# Patient Record
Sex: MALE
[De-identification: names, ages, dates, MRNs, and addresses within clinical notes are randomized per-mention and may not be internally consistent; named-entity substitution may affect disease eponyms.]

---

## 2017-08-31 NOTE — ED PDOC
Arrival/HPI





- General


Chief Complaint: High Blood Pressure


Time Seen by Provider: 08/31/17 21:20


Historian: Patient





- History of Present Illness


Narrative History of Present Illness (Text): 


08/31/17 21:35





A 52 year old male whose past medical history includes hypertension, presents 

to the emergency department with 1 week duration high blood pressure. The 

patient states that today he went to check his blood pressure and it was high. 

The pharmacy advised him to come into the emergency department. The patient 

notes that he checked his blood pressure prior to going on vacation 1 week ago 

and that it was higher than usual and went to visit his PMD who started him on 

Coreg 12.5 twice a day. He denies fevers chills, shortness of breath, chest pain

, abdominal pain, nausea, vomiting, diarrhea, cough, headache, dizziness, or 

any other complaint.





PMD: Dr. Ja Sauceda











Time/Duration: 1 week


Symptom Onset: Sudden


Symptom Course: Unchanged


Activities at Onset: Rest, Light


Context: Home





Past Medical History





- Provider Review


Nursing Documentation Reviewed: Yes





- Infectious Disease


Hx of Infectious Diseases: None





- Tetanus Immunization


Tetanus Immunization: Unknown





- Cardiac


Hx Cardiac Disorders: Yes


Hx Hypertension: Yes





- Endocrine/Metabolic


Hx Endocrine Disorders: Yes


Hx Diabetes Mellitus Type 2: Yes





- Psychiatric


Hx Depression: No


Hx Emotional Abuse: No


Hx Physical Abuse: No


Hx Substance Use: No





- Surgical History


Hx Appendectomy: Yes





- Suicidal Assessment


Feels Threatened In Home Enviroment: No





Family/Social History





- Physician Review


Nursing Documentation Reviewed: Yes


Family/Social History: No Known Family HX


Smoking Status: Never Smoked


Hx Alcohol Use: Yes


Frequency of alcohol use: Socially


Hx Substance Use: No


Hx Substance Use Treatment: No





Allergies/Home Meds


Allergies/Adverse Reactions: 


Allergies





No Known Allergies Allergy (Verified 06/16/13 13:03)


 








Home Medications: 


 Home Meds











 Medication  Instructions  Recorded  Confirmed


 


Carvedilol [Coreg] 12.5 mg PO BID 08/31/17 08/31/17


 


Losartan Potassium 100 mg PO DAILY 08/31/17 08/31/17


 


Metformin HCl [Glucophage] 1,000 mg PO BID 08/31/17 08/31/17


 


SITagliptin [Januvia] 50 mg PO DAILY 08/31/17 08/31/17


 


amLODIPine [Norvasc] 10 mg PO DAILY 08/31/17 08/31/17














Review of Systems





- Physician Review


All systems were reviewed & negative as marked: Yes





- Review of Systems


Constitutional: Other (Hypertensive).  absent: Fevers, Night Sweats


Respiratory: absent: SOB, Cough


Cardiovascular: absent: Chest Pain


Gastrointestinal: absent: Abdominal Pain, Diarrhea, Nausea, Vomiting


Neurological: absent: Headache, Dizziness





Physical Exam


Vital Signs Reviewed: Yes


Vital Signs











  Temp Pulse Resp BP Pulse Ox


 


 08/31/17 21:12  98.8 F  88  16  177/103 H  97











Temperature: Afebrile


Blood Pressure: Hypertensive


Pulse: Regular


Respiratory Rate: Normal


Appearance: Positive for: Well-Appearing, Non-Toxic, Comfortable


Pain Distress: None


Mental Status: Positive for: Alert and Oriented X 3





- Systems Exam


Head: Present: Atraumatic, Normocephalic


Pupils: Present: PERRL


Extroacular Muscles: Present: EOMI


Conjunctiva: Present: Normal


Mouth: Present: Moist Mucous Membranes


Neck: Present: Normal Range of Motion


Respiratory/Chest: Present: Clear to Auscultation, Good Air Exchange.  No: 

Respiratory Distress, Accessory Muscle Use


Cardiovascular: Present: Regular Rate and Rhythm, Normal S1, S2.  No: Murmurs


Abdomen: Present: Normal Bowel Sounds.  No: Tenderness, Distention, Peritoneal 

Signs


Back: Present: Normal Inspection


Upper Extremity: Present: Normal Inspection.  No: Cyanosis, Edema


Lower Extremity: Present: Normal Inspection.  No: Edema


Neurological: Present: GCS=15, CN II-XII Intact, Speech Normal


Skin: Present: Warm, Dry, Normal Color.  No: Rashes


Psychiatric: Present: Alert, Oriented x 3, Normal Insight, Normal Concentration





Medical Decision Making


ED Course and Treatment: 


08/31/17 21:42





Impression:


A 52 year old male presents to the emergency department with 1 week duration 

high blood pressure.





Plan:


-- Reassess and disposition








Progress Notes:


08/31/17 21:40: Case discussed with Dr. Peres. Recommends doubling the dose 

of Coreg. States he will follow up with the patient on Tuesday.














- Scribe Statement


The provider has reviewed the documentation as recorded by the Heather Mcadams





Provider Scribe Attestation:


All medical record entries made by the Scribe were at my direction and 

personally dictated by me. I have reviewed the chart and agree that the record 

accurately reflects my personal performance of the history, physical exam, 

medical decision making, and the department course for this patient. I have 

also personally directed, reviewed, and agree with the discharge instructions 

and disposition








Disposition/Present on Arrival





- Present on Arrival


Any Indicators Present on Arrival: No


History of DVT/PE: No


History of Uncontrolled Diabetes: No


Urinary Catheter: No


History of Decub. Ulcer: No


History Surgical Site Infection Following: None





- Disposition


Have Diagnosis and Disposition been Completed?: Yes


Diagnosis: 


 Hypertensive urgency





Disposition: HOME/ ROUTINE


Disposition Time: 21:49


Patient Problems: 


 Current Active Problems











Problem Status Onset


 


Hypertensive urgency Acute  











Condition: GOOD


Additional Instructions: 


Please follow up with Dr Sauceda on Tuesday. Double your dose of carvedilol: take 

25mg two times per day. Return to the ER for any chest pain, trouble breathing, 

visual disturbance, confusion, numbness, weakness, or for any other concerns. 


Referrals: 


Ja Sauceda JD, MD [Family Provider] - Follow up with primary


Forms:  [employer] (English)

## 2017-12-09 NOTE — HP
REASON FOR ADMISSION:  Left heart cath, possible angioplasty, abnormal

stress test.



BRIEF CLINICAL HISTORY:  This is 52-year-old male with past medical history

significant for Jehovah Witness, does not want any blood product during the

procedure if need arises.  Past history is also significant for diabetes,

hypertension, hyperlipidemia, who underwent stress test because of

complaining of chest pain while the patient is swimming.  Stress is

abnormal, so the patient is scheduled for active cardiac cath, possible

angioplasty.



PAST MEDICAL HISTORY:  Significant for diabetes, hypertension,

hyperlipidemia.



CURRENT MEDICATIONS:  The patient is on amlodipine 10 mg daily, Januvia 100

mg daily, metformin 1 g twice a day, losartan 100 mg daily, Plavix 75 mg

daily, Coreg 25 mg twice a day, atorvastatin 10 mg daily, aspirin 81 mg

daily.



ALLERGIES:  NO KNOWN DRUG ALLERGIES.



CARDIAC WORKUP:  Recent cardiac workup as follows, the patient had a stress

test dated 11/10/2017.  The patient walked for 9 minutes and 31 seconds on

the Rodrick protocol, achieved 90% rate.  No chest pain.  Some ST-T changes

noted, but the patient had baseline left bundle, so cannot comment on

change.  Nuclear scan shows diffuse hypokinesis, ejection fraction 34%,

abnormal partially reversible anteroseptal, apical, inferoseptal, and

inferior defect suggestive of ischemia and wall motion abnormality. 

Multiple area of ischemia noted.



REVIEW OF SYSTEMS:  As per HPI.



PHYSICAL EXAMINATION:  As follows:

VITAL SIGNS:  Height of the patient 5 feet 7 inches, weight of the patient

184, body mass index 28.8 kg/m2, heart rate 60, blood pressure 120/90.

HEENT:  PERRLA intact.

NECK:  Supple.  No carotid bruits or thyromegaly.

CHEST:  Clear to auscultation.

HEART:  S1 and S2 regular.

ABDOMEN:  Soft.

EXTREMITIES:  Clubbing and cyanosis negative.



LABORATORY DATA:  Blood workup pending.



IMPRESSION:  Diabetes, hypertension, hyperlipidemia, obesity, abnormal

stress test with multiple area of abnormality including apical, anterior,

anteroseptal, inferior, inferolateral ischemia with decreased left

ventricular function.



RECOMMENDATION:  We will load aspirin and Plavix, check the blood workup

when available.  Risks, benefits, and alternatives discussed with the

patient.  The patient is Jehovah's Witnesses and expressed his feelings

that he does not want any blood or blood product if need arises.  We will

respect that and try to reach left radial approach and load aspirin and

Plavix.  Further recommendation after cardiac catheterization.  We will

follow with you.



Thank you Dr. Sauceda/ Dr. Milton for providing us the opportunity in taking

care of the patient, Ned Morales.





__________________________________________

Maria L Tsai MD





DD:  12/09/2017 11:03:04

DT:  12/09/2017 13:10:50

Job # [id number]

## 2017-12-11 NOTE — CARD
--------------- APPROVED REPORT --------------





Procedure(s) performed: Left Heart Catheterization

PTCA with Stenting of Mid to Distal RCA with ARIA

PTCA with Stenting of Ostial Circunflex with ARIA

PTCA with Stenting of OM1  with ARIA



HISTORY

The patient is a 52 year-old male with a history of : most recent EF: 

34%. (EF Method: RADIONUCLIDE), diabetes mellitus with oral treatment 

, hypertension , dyslipidemia , had abnormal stress test, showing 

reversible Ischemia in lauren-septal, Apical, inferoseptal 

andinferior region, with reduced LV Fx. EF34%.



INDICATION

The indication(s) include : positive stress test, dyspnea.



CASE TECHNIQUE

The patient was brought electively to the Cardiac Catheterization 

Laboratory in a fasting state and was prepped and draped in a sterile 

manner. The left wrist was infiltrated with 2% Lidocaine subcutaneous 

anesthesia. A 6FR GLIDESHEATH ACCESS KIT sheath was inserted into the 

left radial artery without difficulty. Coronary angiography was 

performed using coronary diagnostic catheters. The left coronary 

system was accessed and visualized with a Diagnostic ,5 Fr JL 3.5 

catheter. The right coronary system was accessed and visualized with 

a Diagnostic ,5 Fr JR 4 catheter. The left ventricle was accessed and 

visualized with a 5 Fr Pigtail 145 (Angled) catheter. Left 

ventricular/Aortic Valve gradient assessed on pullback. Left 

ventriculogram was performed in MALAVE projection. Closure device was 

deployed with a Fr TR Band (Regular) without any complications. The 

patient tolerated the procedure well and there were no complications 

associated with the procedure. 



Vessel Analysis

The patient's coronary anatomy is right dominant.



The left main coronary artery is a medium size vessel with diffuse 

calcification noted throughout this vessel and without significant 

stenosis. The left main trifurcates to the left anterior descending, 

circumflex, and ramus.



The left anterior descending artery is a medium size vessel with 

diffuse calcification noted throughout this vessel and without 

significant stenosis. There is a 50% stenosis in the mid to distal 

segment. Multiple stenoses The first diagonal branch is a medium size 

vessel with diffuse calcification noted throughout this vessel and 

without significant stenosis. 



The circumflex artery is a medium size vessel with diffuse 

calcification noted throughout this vessel and with significant 

stenosis. There is a 80% stenosis in the ostial segment. The first 

obtuse marginal branch is a medium size vessel with diffuse 

calcification noted throughout this vessel and with significant 

stenosis. There is a 90% stenosis in the proximal segment. 



The ramus intermedius artery is a medium size vessel with diffuse 

calcification noted throughout this vessel and without significant 

stenosis. 



The right coronary artery is a large size vessel with diffuse 

calcification noted throughout this vessel and with significant 

stenosis. There is a 80-90% stenosis in the mid segment. three tandum 

lesions The right posterior descending artery is a large size vessel 

with diffuse calcification noted throughout this vessel and without 

significant stenosis. The right posterolateral branch is a medium 

size vessel with diffuse calcification noted throughout this vessel 

and without significant stenosis. 



Left Ventricle

The left ventricle is mildly enlarged in size with moderately 

decreased contractility. Ischemic cardiomyopathy. The left 

ventricular ejection fraction is estimated to be 35%. The left 

ventricular end diastolic pressure is 18 mmHg. There was no gradient 

across the aortic valve upon pullback.



PCI Technique Lesion

Anticoagulation was achieved with Heparin. Percutaneous coronary 

intervention was performed on the mid right coronary artery. The 

lesion stenosis prior to intervention was 80-90% with NEDA 2 flow. A 

6 Fr JR 3.5 Guide Catheter was used to engage the ostium.



BALLOON DILATION

A Balloon catheter 2.0 x 15 mm Mini Trek RX was inserted and inflated 

up to 12.00atm for 25seconds.



STENT DEPLOYMENT

A drug-eluting stent 3.0 x 30 mm Resolute ARIA was inserted and 

inflated up to 10.00atm for 15seconds.



POST STENT DEPLOYMENT BALLOON DILATION

A Balloon catheter 3.5 x 15 mm Trek RX NC was inserted and inflated 

up to 15.00atm for 20seconds.



Final angiography reveals 0 % stenosis with NEDA 3 flow.



PCI Technique Lesion 2

Percutaneous Coronary Intervention was performed on the first obtuse 

marginal branch segment. The lesion stenosis prior to intervention 

was 90% with NEDA 2 flow. A 6 Fr XB 3 Guide Catheter was used to 

engage the ostium. A Luge 182 Interventional Guidewire was used to 

cross the lesion.



BALLOON DILATION

A Balloon catheter 2.0 x 10 mm Sprinter RX was inserted and inflated 

up to 8.00atm for 10seconds. Body wire Choice PT,used in Cx



STENT DEPLOYMENT

A drug-eluting stent 2.5 x 26 mm Resolute ARIA was inserted and 

inflated up to 9.00atm for 15seconds.



POST STENT DEPLOYMENT BALLOON DILATION

A Balloon catheter 2.75 x 12 mm Sprinter NC was inserted and inflated 

up to 14.00atm for 20seconds.



Final angiography reveals 0 % stenosis with NEDA 3 flow.



PCI Technique Lesion 3

Percutaneous Coronary Intervention was performed on the Ostial 

circumflex artery segment. The lesion stenosis prior to intervention 

was 80% with NEDA 2 flow. A 6 Fr XB 3 Guide Catheter was used to 

engage the ostium. A Luge 182 Interventional Guidewire was used to 

cross the lesion.



STENT DEPLOYMENT

A drug-eluting stent 3.0 x 9 mm Resolute ARIA was inserted and 

inflated up to 10.00atm for 25seconds.



POST STENT DEPLOYMENT BALLOON DILATION

A Balloon catheter was inserted and inflated up to 12.00atm for 

10seconds.



Final angiography reveals 0 % stenosis with NEDA 3 flow.



Conclusion

Multi vessel CAD, But distal vesselk are poor target

 for CABG ( Not suitable for CABG).

decreased Lv Fx.EF-35%, EDP-18 mmof Hg.

Successful PTCA with Aria of MID RCA, Ostial Cx and OLIVIA



Recommendations

Cardiac Rehabilitation ReferralDaily ASA with Plavix for at least one 

year

Aggressive Medical TherapyCardiac Risk Reduction Program

Weight Loss Reduction Program

F/U LV fx eval in 3-6 months and if remains less than 35%, consider 

AICD.



CC; DR. Sauceda/ Karine.

## 2017-12-12 NOTE — CARD
--------------- APPROVED REPORT --------------





EKG Measurement

Heart [id number]

IL 186P43

[id number]

[id number]

[id number]



<Conclusion>

Normal sinus rhythm

Left bundle branch block

Abnormal ECG

## 2017-12-12 NOTE — CARD
--------------- APPROVED REPORT --------------





EKG Measurement

Heart [id number]

NC [id number]

[id number]

[id number]

[id number]



<Conclusion>

Normal sinus rhythm

Possible Left atrial enlargement

Left bundle branch block

Abnormal ECG

## 2017-12-12 NOTE — CON
DATE:  12/11/2017



HISTORY OF PRESENT ILLNESS:  The patient is a 52-year-old male who was

admitted for elective cardiac catheterization status post abnormal stress

test.  The patient had been complaining of some angina during exercise as

well as mild dyspnea on exertion.  The patient was seen on consultation by

Cardiology Dr. Tsai who performed a cardiac catheterization and stent

placement today.  The patient is lying in bed postoperatively in no acute

distress.  He denies any chest pain or shortness of breath at the present

time.



PAST MEDICAL HISTORY:  Includes type II diabetes mellitus, hypertension,

and hypercholesteremia.



PAST SURGICAL HISTORY:  The patient has no significant past surgical

history.



ALLERGIES:  THE PATIENT HAS NO KNOWN DRUG ALLERGIES



CURRENT MEDICATIONS:  Include amlodipine 10 mg daily, Plavix 75 mg daily,

Januvia 100 mg daily, metformin 1 g twice daily, carvedilol 25 mg twice

daily, Lipitor 10 mg daily, and aspirin 81 mg daily.



SOCIAL HISTORY:  The patient has no history of tobacco or alcohol use.  He

is independent with ADL's and _____.



REVIEW OF SYSTEMS:  Essentially negative other than above.



PHYSICAL EXAMINATION:

GENERAL:  The patient is a well-developed male, in no acute distress.

VITAL SIGNS:  Blood pressure 140/84, temperature 98, pulse 80 and

respiratory rate 16.

HEENT:  Head is normocephalic and atraumatic.  Pupils are equal, round and

reactive to light.  Extraocular movements are intact.

NECK:  Supple.  No thyromegaly.  No carotid bruit.  No adenopathy.

LUNGS:  Clear.

HEART:  Regular rate and rhythm with no murmurs, rubs, or gallops.

ABDOMEN:  Soft and nontender.  Bowel sounds are normoactive.

EXTREMITIES:  Without cyanosis, clubbing or edema.  Left arm surgical wound

is clean with dressing intact.  There is no cyanosis, clubbing or edema.

NEUROLOGICAL:  The patient is awake and oriented x3 without focal sensory

or motor deficits.

SKIN:  Warm and dry.



LABORATORY DATA:  WBC is 10.4, hemoglobin 13.3 and hematocrit 40.5.  Sodium

136, potassium 4.8, chloride 102, CO2 of 25, BUN 14, creatinine 0.9 and

glucose is slightly elevated 254.



IMPRESSION:

1.  Coronary artery disease status post catheterization with stent

placement.

2.  Hypertension.

3.  Type 2 diabetes mellitus.

4.  Hypercholesteremia.



PLAN:  Continue post catheterization care.  Cardiology follow up Dr. Tsai. 

We will follow the patient in the office within the next one to two weeks.





__________________________________________

CARLOS ALBERTO Vincent MD





DD:  12/11/2017 16:36:36

DT:  12/11/2017 19:36:15

Job # [id number]

## 2019-01-25 ENCOUNTER — HOSPITAL ENCOUNTER (OUTPATIENT)
Dept: HOSPITAL 42 - CARDIO | Age: 54
End: 2019-01-25
Payer: COMMERCIAL

## 2019-01-25 DIAGNOSIS — I25.10: ICD-10-CM

## 2019-01-25 DIAGNOSIS — R07.89: ICD-10-CM

## 2019-01-25 DIAGNOSIS — E11.9: Primary | ICD-10-CM

## 2019-03-31 ENCOUNTER — HOSPITAL ENCOUNTER (INPATIENT)
Dept: HOSPITAL 42 - ED | Age: 54
LOS: 2 days | Discharge: HOME | DRG: 247 | End: 2019-04-02
Attending: INTERNAL MEDICINE | Admitting: INTERNAL MEDICINE
Payer: COMMERCIAL

## 2019-03-31 VITALS — BODY MASS INDEX: 27.3 KG/M2

## 2019-03-31 DIAGNOSIS — Z83.3: ICD-10-CM

## 2019-03-31 DIAGNOSIS — T82.855A: ICD-10-CM

## 2019-03-31 DIAGNOSIS — I44.7: ICD-10-CM

## 2019-03-31 DIAGNOSIS — Z82.49: ICD-10-CM

## 2019-03-31 DIAGNOSIS — I21.4: Primary | ICD-10-CM

## 2019-03-31 DIAGNOSIS — Z79.82: ICD-10-CM

## 2019-03-31 DIAGNOSIS — E78.00: ICD-10-CM

## 2019-03-31 DIAGNOSIS — E78.5: ICD-10-CM

## 2019-03-31 DIAGNOSIS — Z79.02: ICD-10-CM

## 2019-03-31 DIAGNOSIS — I25.110: ICD-10-CM

## 2019-03-31 DIAGNOSIS — I10: ICD-10-CM

## 2019-03-31 DIAGNOSIS — E11.9: ICD-10-CM

## 2019-03-31 DIAGNOSIS — Z90.49: ICD-10-CM

## 2019-03-31 LAB
ALBUMIN SERPL-MCNC: 4 G/DL (ref 3–4.8)
ALBUMIN/GLOB SERPL: 1.2 {RATIO} (ref 1.1–1.8)
ALT SERPL-CCNC: 26 U/L (ref 7–56)
APPEARANCE UR: CLEAR
APTT BLD: 25.5 SECONDS (ref 26.9–38.3)
AST SERPL-CCNC: 25 U/L (ref 17–59)
BASOPHILS # BLD AUTO: 0.03 K/MM3 (ref 0–2)
BASOPHILS NFR BLD: 0.3 % (ref 0–3)
BILIRUB UR-MCNC: NEGATIVE MG/DL
BNP SERPL-MCNC: 63.5 PG/ML (ref 0–450)
BUN SERPL-MCNC: 14 MG/DL (ref 7–21)
CALCIUM SERPL-MCNC: 9.4 MG/DL (ref 8.4–10.5)
COLOR UR: YELLOW
EOSINOPHIL # BLD: 0.1 10*3/UL (ref 0–0.7)
EOSINOPHIL NFR BLD: 1.3 % (ref 1.5–5)
ERYTHROCYTE [DISTWIDTH] IN BLOOD BY AUTOMATED COUNT: 12.9 % (ref 11.5–14.5)
GFR NON-AFRICAN AMERICAN: > 60
GLUCOSE UR STRIP-MCNC: 250 MG/DL
HGB BLD-MCNC: 14 G/DL (ref 14–18)
INR PPP: 0.88
LEUKOCYTE ESTERASE UR-ACNC: NEGATIVE LEU/UL
LYMPHOCYTES # BLD: 3.3 10*3/UL (ref 1.2–3.4)
LYMPHOCYTES NFR BLD AUTO: 29.5 % (ref 22–35)
MCH RBC QN AUTO: 28.6 PG (ref 25–35)
MCHC RBC AUTO-ENTMCNC: 33.5 G/DL (ref 31–37)
MCV RBC AUTO: 85.5 FL (ref 80–105)
MONOCYTES # BLD AUTO: 0.4 10*3/UL (ref 0.1–0.6)
MONOCYTES NFR BLD: 3.9 % (ref 1–6)
PH UR STRIP: 6 [PH] (ref 4.7–8)
PLATELET # BLD: 239 10^3/UL (ref 120–450)
PMV BLD AUTO: 11.2 FL (ref 7–11)
PROT UR STRIP-MCNC: NEGATIVE MG/DL
PROTHROMBIN TIME: 9.9 SECONDS (ref 9.4–12.5)
RBC # BLD AUTO: 4.89 10^6/UL (ref 3.5–6.1)
RBC # UR STRIP: NEGATIVE /UL
SP GR UR STRIP: 1.02 (ref 1–1.03)
TROPONIN I SERPL-MCNC: < 0.01 NG/ML
UROBILINOGEN UR STRIP-ACNC: 0.2 E.U./DL
WBC # BLD AUTO: 11.1 10^3/UL (ref 4.5–11)

## 2019-03-31 PROCEDURE — 3E053PZ INTRODUCTION OF PLATELET INHIBITOR INTO PERIPHERAL ARTERY, PERCUTANEOUS APPROACH: ICD-10-PCS | Performed by: INTERNAL MEDICINE

## 2019-03-31 RX ADMIN — INSULIN HUMAN SCH UNITS: 100 INJECTION, SOLUTION PARENTERAL at 09:50

## 2019-03-31 RX ADMIN — EPTIFIBATIDE SCH MLS/HR: 0.75 INJECTION, SOLUTION INTRAVENOUS at 12:06

## 2019-03-31 RX ADMIN — EPTIFIBATIDE SCH MLS/HR: 0.75 INJECTION, SOLUTION INTRAVENOUS at 20:02

## 2019-03-31 RX ADMIN — METOPROLOL TARTRATE SCH MG: 5 INJECTION INTRAVENOUS at 15:44

## 2019-03-31 RX ADMIN — INSULIN HUMAN SCH: 100 INJECTION, SOLUTION PARENTERAL at 15:43

## 2019-03-31 RX ADMIN — METOPROLOL TARTRATE SCH MG: 5 INJECTION INTRAVENOUS at 21:42

## 2019-03-31 RX ADMIN — INSULIN HUMAN SCH UNITS: 100 INJECTION, SOLUTION PARENTERAL at 21:41

## 2019-03-31 NOTE — CARD
--------------- APPROVED REPORT --------------





Date of service: 03/31/2019



EKG Measurement

Heart [id number]

AR 180P44

[id number]

[id number]

[id number]



<Conclusion>

Sinus tachycardia 

Left bundle branch block

Abnormal ECG

## 2019-03-31 NOTE — CON
DATE:  2019



HISTORY OF PRESENT ILLNESS:  This 54-year-old gentleman with history of

diabetes mellitus type 2, hypercholesterolemia, and coronary artery

disease, status post stents placement 15 months ago who presented to

[facility] with substernal chest pain, pressure-like 5/10

without radiating to any other part of the body.  He did not complain of

any shortness of breath, fever, chills, or sweats; however, did have one

episode of vomiting.  The patient reports that the pain appeared about hour

and a half ago and was constant.



PAST MEDICAL HISTORY:  Diabetes mellitus type 2, hypercholesterolemia,

hypertension, and coronary artery disease.



FAMILY HISTORY:  His father  at the age of 69 of heart attack.



SOCIAL HISTORY:  Lifelong nonsmoker.  No alcohol or illicit drug abuse.



ALLERGIES:  NKDA.



MEDICATIONS AT HOME:  Norvasc, Januvia, metformin, losartan, Plavix, Coreg,

Lipitor, and aspirin.



REVIEW OF SYSTEMS:  Review of 12-organ system other than mentioned in

history of present illness is negative.



PHYSICAL EXAMINATION:

VITAL SIGNS:  Temperature 98, blood pressure 136/80, oxygen saturation 97%

on 2 L nasal cannula, respiratory rate 18, and heart rate 88.

ENT:  Head and neck atraumatic.

LUNGS:  Clear to auscultation bilaterally.

HEART:  Regular rate and rhythm.  S1 and S2 normal.

ABDOMEN:  Soft, nontender, and nondistended.

MUSCULOSKELETAL:  No C/C/E.

NEUROLOGIC:  The patient moves all extremities spontaneously.

SKIN:  Moist.

PSYCHIATRIC:  The patient is alert, awake, and oriented x3.



LABORATORY DATA:  Sodium 139, potassium 3.8, chloride 102, carbon dioxide

23, BUN 14, creatinine 0.8, and glucose 191.  AST 25, ALT 26, and total

bilirubin 0.3.  Troponin less than 0.01.  ProBNP is 63.5 and albumin 4. 

WBC 11.1, hemoglobin 14, and platelet count 239.



EKG showed ST depression in V5 and V6 and left bundle-branch block, which

appears to be old.



ASSESSMENT AND PLAN:  This is a 54-year-old gentleman who presented with

pressure-like substernal chest pain highly suspicious for cardiac in

origin.  First troponin is negative and subtle new changes on

EKG do not allow to solidly say whether they are relating to ischemia or 
represent

difference in position of the leads.  Left bundle-branch block is old.  At the

present time, I would proceed with aspirin, Plavix, beta-blocker, statins,

nitroglycerin drip, heparin drip, and trending troponin.  If second

troponin comes up negative, possibility of acute myocardial ischemia due to

coronary event would be much less likely.  We will continue to target euvolemia,

euglycemia, normothermia, and oxygen saturation more than 90%.  We will

continue with DVT and GI prophylaxes.



Addendum: second troponin came back elevated, cardiology service notified, nitro
drip increased, chest pain resolved, scheduled for angio/PCI in am



ccm time 40 min





__________________________________________

Gordo Page MD





DD:  2019 9:00:45

DT:  2019 14:16:47

Job # [id number]

MTDD

## 2019-03-31 NOTE — RAD
Date of service: 



03/31/2019



HISTORY:

 chest pain 



COMPARISON:

No prior. 



TECHNIQUE:

1 view obtained.



FINDINGS:



LUNGS:

No active pulmonary disease.



PLEURA:

No significant pleural effusion identified, no pneumothorax apparent.



CARDIOVASCULAR:

No aortic atherosclerotic calcification present.



Normal cardiac size. No pulmonary vascular congestion. 



OSSEOUS STRUCTURES:

No significant abnormalities.



VISUALIZED UPPER ABDOMEN:

Normal.



OTHER FINDINGS:

None.



IMPRESSION:

No active disease.

## 2019-03-31 NOTE — HP
DATE OF EXAM:  03/31/2019



HISTORY OF PRESENT ILLNESS:  The patient is a 54-year-old male admitted

through the Emergency Department today, complaining of precordial chest

pressure.  The patient denies any nausea, no vomiting, no shortness of

breath.  The patient has a history of coronary artery disease status post

stent placement x3.  He is admitted to the Intensive Care Unit for further

evaluation and monitoring with an elevation of troponin of 0.58, rising

from less than 0.01 previously.



PAST MEDICAL HISTORY:  Includes type 2 diabetes mellitus, hypertension, and

hypercholesterolemia.



PAST SURGICAL HISTORY:  Includes status post appendectomy.  There is no

history of cancer.



ALLERGIES:  PATIENT HAS NO KNOWN ALLERGIES.



CURRENT MEDICATIONS:  Include metformin 1000 mg twice daily, Januvia 100 mg

daily, losartan 100 mg daily, carvedilol 25 mg twice daily, Lipitor 10 mg

daily, and Plavix 75 mg daily.



FAMILY HISTORY:  The patient has a family history of heart disease and

diabetes.



SOCIAL HISTORY:  The patient denies any history of tobacco or alcohol use.



REVIEW OF SYSTEMS:  The patient denies any abdominal pain, no melena, and

no bright red blood per rectum.  No nausea, no vomiting, and no diarrhea. 

No fever, no chills, and no cough.  No jaundice and no rash.



PHYSICAL EXAMINATION:

GENERAL:  The patient is a well-developed male in no acute distress.

VITAL SIGNS:  Blood pressure 163/93, pulse 87, respiratory rate 23. 

Patient is afebrile.

HEENT:  Head is normocephalic and atraumatic.  Pupils equal, round,

reactive to light.  Extraocular movement intact.

NECK:  Supple.  No thyromegaly.  No carotid bruit.  No adenopathy.

LUNGS:  Clear.

HEART:  Regular rate and rhythm.

ABDOMEN:  Soft and nontender.  Bowel sounds are normoactive.

EXTREMITIES:  Without cyanosis, clubbing or edema.

NEUROLOGIC:  The patient is awake and oriented x3 without focal sensory or

motor deficits.

SKIN:  Warm and dry.



LABORATORY DATA:  WBC is 11.1, hemoglobin 14.0, and hematocrit 41.8. 

Sodium 139, potassium 3.8, chloride 102, CO2 of 23, BUN 14, creatinine 0.8,

and glucose is 191.  Troponin was less than 0.01 at 7:30 a.m. and 9:15 a.m.

was 0.58.  Chest x-ray shows no active disease.



IMPRESSION:

1.  Coronary artery disease, rule out non-ST segment elevation myocardial

infarction.

2.  Type 2 diabetes mellitus.

3.  Hypertension.

4.  Hypercholesterolemia.



PLAN:  The patient is admitted to Intensive Care Unit.  We will obtain

Cardiology consult from Dr. Tsai/Dr. Milton for possible cardiac

catheterization.  Monitor electrolytes and glucose levels.







__________________________________________

Ja Sauceda JD/ MD





DD:  03/31/2019 15:29:40

DT:  03/31/2019 23:28:09

Job # [id number]

## 2019-03-31 NOTE — CARD
--------------- APPROVED REPORT --------------





Date of service: 03/31/2019



EKG Measurement

Heart [id number]

UT [id number]

[id number]

[id number]

[id number]



<Conclusion>

Normal sinus rhythm

Left bundle branch block

Abnormal ECG

## 2019-03-31 NOTE — CON
DATE:  03/31/2019



CARDIOLOGY CONSULTATION



For Dr. Tsai.



HISTORY OF PRESENT ILLNESS:  The patient is a 54-year-old male who presents

with substernal chest pressure this morning, which is not relieved with

_____ and he presented to the emergency room.  He has diabetes mellitus,

hypertension, and hypercholesterolemia.  He had multiple stents placed

about a year and half ago.  His stress test in January was abnormal. 

Because of his ongoing symptoms, the patient was brought into the emergency

room and admitted.  He started on Tridil, heparin, and beta-blockers.  He

is currently asymptotic.



SOCIAL HISTORY:  He denies smoking.  The patient is a schoolteacher.



REVIEW OF SYSTEMS:  As above.



PHYSICAL EXAMINATION:

VITAL SIGNS:  Blood pressure is 154/59 and heart rate is in the 80s.

NECK:  Negative JVD.

LUNGS:  Without rales.

HEART:  S1 and S2.

EXTREMITIES:  Without edema.



LABORATORY DATA:  EKG shows left bundle-branch block.  Laboratory reveals a

troponin as 0.58 with a glucose of 191.  Hemoglobin is 14.



IMPRESSION:

1.  Non-ST elevation myocardial infarction.

2.  Unstable angina.

3.  High probability for new coronary lesion.

4.  History of triple-vessel coronary artery disease.

5.  Diabetes mellitus.

6.  Hypertension.

7.  Hypercholesterolemia.

8.  Systemic hypertension.



PLAN:  Given these findings, we will start the patient on IV heparin, IV

Integrilin, resume his aspirin and Plavix.  We will discuss with Dr. Tsai

to arrange for cardiac catheterization in the morning.







__________________________________________

Jam Mcwilliams MD





DD:  03/31/2019 11:57:13

DT:  03/31/2019 22:18:30

Job # [id number]

## 2019-03-31 NOTE — CARD
--------------- APPROVED REPORT --------------





Date of service: 03/31/2019



EKG Measurement

Heart [id number]

MD [id number]

[id number]

[id number]

[id number]



<Conclusion>

Normal sinus rhythm

Left bundle branch block

Abnormal ECG

## 2019-03-31 NOTE — ED PDOC
Arrival/HPI





- General


Chief Complaint: Chest Pain


Time Seen by Provider: 03/31/19 07:21


Historian: Patient





- Critical Care


Critical Care Minutes: 90 minutes





- History of Present Illness


Narrative History of Present Illness (Text): 





03/31/19 07:37


54 year old male, whose past medical history includes diabetes, hypertension, 

hypercholesterolemia, s/p 3 stents (15 months ago), presents to the emergency 

department complaining of chest pain that woke him up 1.5 hours prior to 

arrival. Patient describes the pain as a pressure-like sensation. He did not 

take any medication for the pain. Patient had a stress test done 01/25/19. 

Patient did not take his dose of hypertension medication today. Patient 

occasionally drinks, and admits to drinking last night. Patient denies any 

history of smoking.  Patient denies any fever, chills, shortness of breath, 

nausea, vomiting, diarrhea, urinary symptoms, back pain, neck pain, headache, 

dizziness, or any other complaints. 





PMD: Dr. Sauceda


Cardiologist: Dr. Eulalio Caban





Time/Duration: Other (1.5 hours ago)


Symptom Onset: Sudden


Symptom Course: Unchanged


Activities at Onset: Sleeping


Context: Home





Past Medical History





- Provider Review


Nursing Documentation Reviewed: Yes





- Infectious Disease


Hx of Infectious Diseases: None





- Tetanus Immunization


Tetanus Immunization: Unknown





- Cardiac


Hx Hypertension: Yes





- Neurological


Hx Paralysis: No





- Endocrine/Metabolic


Hx Endocrine Disorders: Yes


Hx Diabetes Mellitus Type 2: Yes





- Hematological/Oncological


Hx Blood Transfusions: No (PT IS JEHOVAHS WITTNESS/NO BLOOD TRANSFUSIONS)





- Musculoskeletal/Rheumatological


Hx Musculoskeletal Disorders: No





- Psychiatric


Hx Emotional Abuse: No


Hx Physical Abuse: No


Hx Substance Use: No





- Surgical History


Hx Cardiac Catheterization: Yes (x3)





- Anesthesia


Hx Anesthesia Reactions: No


Hx Malignant Hyperthermia: No





- Suicidal Assessment


Feels Threatened In Home Enviroment: No





Family/Social History





- Physician Review


Nursing Documentation Reviewed: Yes


Family/Social History: No Known Family HX


Smoking Status: Never Smoked


Hx Alcohol Use: Yes (2X MONTH)


Hx Substance Use: No


Hx Substance Use Treatment: No





Allergies/Home Meds


Allergies/Adverse Reactions: 


Allergies





No Known Allergies Allergy (Verified 03/31/19 12:07)


   








Home Medications: 


                                    Home Meds











 Medication  Instructions  Recorded  Confirmed


 


Carvedilol [Coreg] 25 mg PO BID 08/31/17 03/31/19


 


Losartan Potassium 100 mg PO DAILY 08/31/17 03/31/19


 


SITagliptin [Januvia] 100 mg PO DAILY 08/31/17 03/31/19


 


amLODIPine [Norvasc] 10 mg PO DAILY 08/31/17 03/31/19


 


Aspirin [Aspirin Chewable] 81 mg PO DAILY 11/09/17 03/31/19


 


Atorvastatin [Lipitor] 10 mg PO DIN 11/09/17 03/31/19


 


Clopidogrel [Plavix] 75 mg PO DAILY 12/05/17 03/31/19


 


MetFORMIN [glucOPHAGE] 1,000 mg PO BID 01/25/19 03/31/19














Review of Systems





- Physician Review


All systems were reviewed & negative as marked: Yes





- Review of Systems


Constitutional: absent: Fevers, Other (chills)


Respiratory: absent: SOB


Cardiovascular: Chest Pain


Gastrointestinal: absent: Diarrhea, Nausea, Vomiting


Genitourinary Male: absent: Dysuria, Frequency, Hematuria


Musculoskeletal: absent: Back Pain, Neck Pain


Neurological: absent: Headache, Dizziness





Physical Exam


Vital Signs Reviewed: Yes





Vital Signs











  Temp Pulse Pulse Resp BP BP Pulse Ox


 


 03/31/19 07:31    95 H    163/99 H 


 


 03/31/19 07:19  97.5 F L  91 H   18  171/107 H   100











Temperature: Afebrile


Blood Pressure: Hypertensive


Pulse: Regular


Respiratory Rate: Normal


Appearance: Positive for: Well-Appearing, Non-Toxic, Comfortable


Pain Distress: None


Mental Status: Positive for: Alert and Oriented X 3





- Systems Exam


Head: Present: Atraumatic, Normocephalic


Pupils: Present: PERRL


Extroacular Muscles: Present: EOMI


Conjunctiva: Present: Normal


Mouth: Present: Moist Mucous Membranes


Neck: Present: Normal Range of Motion


Respiratory/Chest: Present: Clear to Auscultation, Good Air Exchange.  No: 

Respiratory Distress, Accessory Muscle Use


Cardiovascular: Present: Regular Rate and Rhythm, Normal S1, S2.  No: Murmurs


Abdomen: No: Tenderness, Distention, Peritoneal Signs


Back: Present: Normal Inspection


Upper Extremity: Present: Normal Inspection.  No: Cyanosis, Edema


Lower Extremity: Present: Normal Inspection.  No: Edema


Neurological: Present: GCS=15, Speech Normal


Skin: Present: Warm, Dry, Normal Color.  No: Rashes


Psychiatric: Present: Alert, Oriented x 3, Normal Insight, Normal Concentration





Medical Decision Making


ED Course and Treatment: 





03/31/19 07:37


Impression:


54 year old male presents complaining of chest pressure that woke him up this 

morning 1.5 hours prior to arrival. 





Plan:


-- EKG 


-- Labs


-- Chest X-ray 


-- Aspirin, Morphine, Nitrostate SL Tab 


-- Reassess and disposition





Prior Visits:


Notes and results from previous visits were reviewed. 





Progress Notes:





EKG shows Sinus at 91 BPM with LBBB flattening of the t-wave V4 and inversion of

 the t-wave in V5 which is new compared to 12/2017. LBBB is old. Interpreted by 

me. 





03/31/19 07:40


Called sent out to Dr. Tsai





03/31/19 07:48


Patient is vomiting in the ER as per nurse. 





03/31/19 08:12


Case discussed with Dr. Mcwilliams covering Dr. Tsai who is aware and recommends 

plavix, heparin drip, and nitroglycerin drip. 





03/31/19 09:15


Case discussed with the intensivist Dr. Page who is requests a second 

Troponin to be done. 





03/31/19 10:00


At this time, patient is pain free. 





03/31/19 10:32


Labs reviewed. Second Troponin is 0.58





03/31/19 10:50


Case discussed with patient's PMD Dr. Peres who is aware and agrees with the 

plan.





03/31/19 10:53


Case discussed with the Intensivist Dr. Page who accepts patient into ICU.








- Critical Care


Critical Care Minutes: 90 minutes





- Lab Interpretations


I have reviewed the lab results: Yes





- RAD Interpretation


Radiology Orders: 











03/31/19 07:28


CHEST PORTABLE [RAD] Stat 











: Radiologist





- EKG Interpretation


Interpreted by ED Physician: Yes


Type: 12 lead EKG





- Medication Orders


Current Medication Orders: 











Morphine Sulfate (Morphine)  2 mg IVP STAT STA


   Stop: 03/31/19 07:37


Nitroglycerin (Nitrostat Sl Tab)  0.4 mg SL STAT STA


   Stop: 03/31/19 07:37





Discontinued Medications





Aspirin (Aspirin)  325 mg PO STAT STA


   Stop: 03/31/19 07:29











- Scribe Statement


The provider has reviewed the documentation as recorded by the Heather Martinez





Provider Scribe Attestation:


All medical record entries made by the Scribe were at my direction and 

personally dictated by me. I have reviewed the chart and agree that the record 

accurately reflects my personal performance of the history, physical exam, 

medical decision making, and the department course for this patient. I have also

 personally directed, reviewed, and agree with the discharge instructions and 

disposition.








Disposition/Present on Arrival





- Present on Arrival


Any Indicators Present on Arrival: No


History of DVT/PE: No


History of Uncontrolled Diabetes: No


Urinary Catheter: No


History of Decub. Ulcer: No


History Surgical Site Infection Following: None





- Disposition


Have Diagnosis and Disposition been Completed?: Yes


Diagnosis: 


 NSTEMI (non-ST elevated myocardial infarction), Chest pain





Disposition: HOSPITALIZED


Disposition Time: 09:30


Condition: SERIOUS

## 2019-04-01 PROCEDURE — B2151ZZ FLUOROSCOPY OF LEFT HEART USING LOW OSMOLAR CONTRAST: ICD-10-PCS | Performed by: INTERNAL MEDICINE

## 2019-04-01 PROCEDURE — 027034Z DILATION OF CORONARY ARTERY, ONE ARTERY WITH DRUG-ELUTING INTRALUMINAL DEVICE, PERCUTANEOUS APPROACH: ICD-10-PCS | Performed by: INTERNAL MEDICINE

## 2019-04-01 PROCEDURE — B2111ZZ FLUOROSCOPY OF MULTIPLE CORONARY ARTERIES USING LOW OSMOLAR CONTRAST: ICD-10-PCS | Performed by: INTERNAL MEDICINE

## 2019-04-01 PROCEDURE — 4A023N7 MEASUREMENT OF CARDIAC SAMPLING AND PRESSURE, LEFT HEART, PERCUTANEOUS APPROACH: ICD-10-PCS | Performed by: INTERNAL MEDICINE

## 2019-04-01 PROCEDURE — 3E033PZ INTRODUCTION OF PLATELET INHIBITOR INTO PERIPHERAL VEIN, PERCUTANEOUS APPROACH: ICD-10-PCS | Performed by: INTERNAL MEDICINE

## 2019-04-01 RX ADMIN — INSULIN HUMAN SCH U: 100 INJECTION, SOLUTION PARENTERAL at 15:25

## 2019-04-01 RX ADMIN — INSULIN HUMAN SCH U: 100 INJECTION, SOLUTION PARENTERAL at 10:15

## 2019-04-01 RX ADMIN — INSULIN HUMAN SCH UNITS: 100 INJECTION, SOLUTION PARENTERAL at 11:27

## 2019-04-01 RX ADMIN — METOPROLOL TARTRATE SCH MG: 5 INJECTION INTRAVENOUS at 04:20

## 2019-04-01 RX ADMIN — INSULIN HUMAN SCH: 100 INJECTION, SOLUTION PARENTERAL at 03:07

## 2019-04-01 RX ADMIN — INSULIN HUMAN SCH: 100 INJECTION, SOLUTION PARENTERAL at 22:24

## 2019-04-01 NOTE — CP.CCUPN
<Kashmir Zapien - Last Filed: 04/01/19 12:09>





CCU Subjective





- Physician Review


Events Since Last Encounter (Free Text): 





04/01/19 11:32


pt taken to cath lab today


Subjective (Free Text): 





04/01/19 11:33


Pt seen and examined this morning at bedside in the ICU, denies chest pain, SOB,

or bleeding from the cath site





CCU Objective





- Vital Signs / Intake & Output


Vital Signs (Last 4 hours): 


Vital Signs











  Pulse BP


 


 04/01/19 11:28  27 L  135/81











Intake and Output (Last 8hrs): 


                                 Intake & Output











 03/31/19 04/01/19 04/01/19





 22:59 06:59 14:59


 


Intake Total 1065.5 495 


 


Output Total 1000 950 


 


Balance 65.5 -455 


 


Weight  176 lb 1.6 oz 


 


Intake:   


 


  .5 215 


 


    Right Antecubital  215 


 


    Right Hand 184  


 


  Oral 870 280 


 


Output:   


 


  Urine 1000 950 


 


    Urine, Voided 1000 950 


 


Other:   


 


  # Bowel Movements  1 














- Physical Exam


Physical Exam Limitations: Negative for: Altered Mental Status


Head: Positive for: Atraumatic, Normocephalic


Pupils: Positive for: PERRL


Extroacular Muscles: Positive for: EOMI


Conjunctiva: Positive for: Normal


Mouth: Positive for: Moist Mucous Membranes


Neck: Positive for: Normal Range of Motion


Respiratory/Chest: Positive for: Clear to Auscultation, Good Air Exchange.  

Negative for: Respiratory Distress, Accessory Muscle Use


Cardiovascular: Positive for: Regular Rate and Rhythm, Normal S1, S2.  Negative 

for: Murmurs


Abdomen: Negative for: Tenderness, Distention, Peritoneal Signs


Back: Positive for: Normal Inspection


Upper Extremity: Positive for: Normal Inspection.  Negative for: Cyanosis, Edema


Lower Extremity: Positive for: Normal Inspection, Other (cath site, rigth groin,

bandage is clean dry and intact).  Negative for: Edema


Neurological: Positive for: GCS=15, Speech Normal


Skin: Positive for: Warm, Dry, Normal Color.  Negative for: Rashes


Psychiatric: Positive for: Alert, Oriented x 3, Normal Insight, Normal 

Concentration





- Medications


Active Medications: 


Active Medications











Generic Name Dose Route Start Last Admin





  Trade Name Freq  PRN Reason Stop Dose Admin


 


Aspirin  81 mg  04/01/19 10:00  04/01/19 11:26





  Ecotrin  PO   Not Given





  DAILY [facility]   





     





     





     





     


 


Atorvastatin Calcium  40 mg  04/01/19 17:00  





  Lipitor  PO   





  DIN [facility]   





     





     





     





     


 


Clopidogrel Bisulfate  75 mg  04/01/19 10:00  





  Plavix  PO   





  DAILY [facility]   





     





     





     





     


 


Sodium Chloride  1,000 mls @ 100 mls/hr  04/01/19 09:15  04/01/19 11:29





  Sodium Chloride 0.9%  IV  04/01/19 15:00  100 mls/hr





  .Q10H [facility]   Administration





     





     





     





     


 


Insulin Human Regular  0 units  03/31/19 09:15  04/01/19 11:27





  Humulin R Med  SC   5 units





  Q6H [facility]   Administration





     





     





  Protocol   





     


 


Metoprolol Tartrate  5 mg  03/31/19 16:00  04/01/19 04:20





  Lopressor  IVP   5 mg





  Q6H [facility]   Administration





     





     





     





     


 


Metoprolol Tartrate  25 mg  04/01/19 10:00  04/01/19 11:28





  Lopressor  PO   25 mg





  BID [facility]   Administration





     





     





     





     














- Patient Studies


Lab Studies: 


                                   Lab Studies











  04/01/19 04/01/19 03/31/19 Range/Units





  10:29 02:57 21:30 


 


APTT    52.4 H  (26.9-38.3)  Seconds


 


POC Glucose (mg/dL)  263 H  165 H   ()  mg/dL


 


Urine Color     (YELLOW)  


 


Urine Appearance     (CLEAR)  


 


Urine pH     (4.7-8.0)  


 


Ur Specific Gravity     (1.005-1.035)  


 


Urine Protein     (<30 mg/dL)  mg/dL


 


Urine Glucose (UA)     (NEGATIVE)  mg/dL


 


Urine Ketones     (NEGATIVE)  mg/dL


 


Urine Blood     (NEGATIVE)  


 


Urine Nitrate     (NEGATIVE)  


 


Urine Bilirubin     (NEGATIVE)  


 


Urine Urobilinogen     (<1 E.U./dL)  E.U./dL


 


Ur Leukocyte Esterase     (NEGATIVE)  Jeremiah/uL


 


Urine Opiates Screen     (NEGATIVE)  


 


Urine Methadone Screen     (NEGATIVE)  


 


Ur Barbiturates Screen     (NEGATIVE)  


 


Ur Phencyclidine Scrn     (NEGATIVE)  


 


Ur Amphetamines Screen     (NEGATIVE)  


 


U Benzodiazepines Scrn     (NEGATIVE)  


 


U Oth Cocaine Metabols     (NEGATIVE)  


 


U Cannabinoids Screen     (NEGATIVE)  














  03/31/19 03/31/19 03/31/19 Range/Units





  21:21 15:30 11:05 


 


APTT   55.1 H   (26.9-38.3)  Seconds


 


POC Glucose (mg/dL)  303 H    ()  mg/dL


 


Urine Color     (YELLOW)  


 


Urine Appearance     (CLEAR)  


 


Urine pH     (4.7-8.0)  


 


Ur Specific Gravity     (1.005-1.035)  


 


Urine Protein     (<30 mg/dL)  mg/dL


 


Urine Glucose (UA)     (NEGATIVE)  mg/dL


 


Urine Ketones     (NEGATIVE)  mg/dL


 


Urine Blood     (NEGATIVE)  


 


Urine Nitrate     (NEGATIVE)  


 


Urine Bilirubin     (NEGATIVE)  


 


Urine Urobilinogen     (<1 E.U./dL)  E.U./dL


 


Ur Leukocyte Esterase     (NEGATIVE)  Jeremiah/uL


 


Urine Opiates Screen    Positive H  (NEGATIVE)  


 


Urine Methadone Screen    Negative  (NEGATIVE)  


 


Ur Barbiturates Screen    Negative  (NEGATIVE)  


 


Ur Phencyclidine Scrn    Negative  (NEGATIVE)  


 


Ur Amphetamines Screen    Negative  (NEGATIVE)  


 


U Benzodiazepines Scrn    Negative  (NEGATIVE)  


 


U Oth Cocaine Metabols    Negative  (NEGATIVE)  


 


U Cannabinoids Screen    Negative  (NEGATIVE)  














  03/31/19 Range/Units





  11:05 


 


APTT   (26.9-38.3)  Seconds


 


POC Glucose (mg/dL)   ()  mg/dL


 


Urine Color  Yellow  (YELLOW)  


 


Urine Appearance  Clear  (CLEAR)  


 


Urine pH  6.0  (4.7-8.0)  


 


Ur Specific Gravity  1.025  (1.005-1.035)  


 


Urine Protein  Negative  (<30 mg/dL)  mg/dL


 


Urine Glucose (UA)  250 H  (NEGATIVE)  mg/dL


 


Urine Ketones  15 H  (NEGATIVE)  mg/dL


 


Urine Blood  Negative  (NEGATIVE)  


 


Urine Nitrate  Negative  (NEGATIVE)  


 


Urine Bilirubin  Negative  (NEGATIVE)  


 


Urine Urobilinogen  0.2  (<1 E.U./dL)  E.U./dL


 


Ur Leukocyte Esterase  Negative  (NEGATIVE)  Jeremiah/uL


 


Urine Opiates Screen   (NEGATIVE)  


 


Urine Methadone Screen   (NEGATIVE)  


 


Ur Barbiturates Screen   (NEGATIVE)  


 


Ur Phencyclidine Scrn   (NEGATIVE)  


 


Ur Amphetamines Screen   (NEGATIVE)  


 


U Benzodiazepines Scrn   (NEGATIVE)  


 


U Oth Cocaine Metabols   (NEGATIVE)  


 


U Cannabinoids Screen   (NEGATIVE)  








                         Laboratory Results - last 24 hr











  03/31/19 03/31/19 03/31/19





  11:05 11:05 15:30


 


APTT    55.1 H


 


POC Glucose (mg/dL)   


 


Urine Color  Yellow  


 


Urine Appearance  Clear  


 


Urine pH  6.0  


 


Ur Specific Gravity  1.025  


 


Urine Protein  Negative  


 


Urine Glucose (UA)  250 H  


 


Urine Ketones  15 H  


 


Urine Blood  Negative  


 


Urine Nitrate  Negative  


 


Urine Bilirubin  Negative  


 


Urine Urobilinogen  0.2  


 


Ur Leukocyte Esterase  Negative  


 


Urine Opiates Screen   Positive H 


 


Urine Methadone Screen   Negative 


 


Ur Barbiturates Screen   Negative 


 


Ur Phencyclidine Scrn   Negative 


 


Ur Amphetamines Screen   Negative 


 


U Benzodiazepines Scrn   Negative 


 


U Oth Cocaine Metabols   Negative 


 


U Cannabinoids Screen   Negative 














  03/31/19 03/31/19 04/01/19





  21:21 21:30 02:57


 


APTT   52.4 H 


 


POC Glucose (mg/dL)  303 H   165 H


 


Urine Color   


 


Urine Appearance   


 


Urine pH   


 


Ur Specific Gravity   


 


Urine Protein   


 


Urine Glucose (UA)   


 


Urine Ketones   


 


Urine Blood   


 


Urine Nitrate   


 


Urine Bilirubin   


 


Urine Urobilinogen   


 


Ur Leukocyte Esterase   


 


Urine Opiates Screen   


 


Urine Methadone Screen   


 


Ur Barbiturates Screen   


 


Ur Phencyclidine Scrn   


 


Ur Amphetamines Screen   


 


U Benzodiazepines Scrn   


 


U Oth Cocaine Metabols   


 


U Cannabinoids Screen   














  04/01/19





  10:29


 


APTT 


 


POC Glucose (mg/dL)  263 H


 


Urine Color 


 


Urine Appearance 


 


Urine pH 


 


Ur Specific Gravity 


 


Urine Protein 


 


Urine Glucose (UA) 


 


Urine Ketones 


 


Urine Blood 


 


Urine Nitrate 


 


Urine Bilirubin 


 


Urine Urobilinogen 


 


Ur Leukocyte Esterase 


 


Urine Opiates Screen 


 


Urine Methadone Screen 


 


Ur Barbiturates Screen 


 


Ur Phencyclidine Scrn 


 


Ur Amphetamines Screen 


 


U Benzodiazepines Scrn 


 


U Oth Cocaine Metabols 


 


U Cannabinoids Screen 











EKG/Cardiology Studies: 


Cardiology / EKG Studies





03/31/19 10:52


EKG [ELECTROCARDIOGRAM] Stat 


   Comment: 


   Reason For Exam: CHEST PAIN





03/31/19 14:58


EKG [ELECTROCARDIOGRAM] Stat 


   Comment: 


   Reason For Exam: chest pain/ hx of three stents/ increased troponin





04/01/19 07:01


EKG [ELECTROCARDIOGRAM] Stat 


   Comment: 


   Reason For Exam: positive troponin


   Does Patient Have a Pacemaker?: No





04/01/19 09:15


ELECTROCARDIOGRAM DAILY 


   Comment: 


   Reason For Exam: chest pain


ELECTROCARDIOGRAM Urgent 


   Comment: 12 lead EKG upon arrival in unit


   Reason For Exam: post ptca





04/01/19 09:19


ELECTROCARDIOGRAM Urgent 


   Comment: 12 lead EKG upon arrival in unit


   Reason For Exam: post ptca





04/01/19 09:30


ELECTROCARDIOGRAM DAILY 


   Comment: 


   Reason For Exam: chest pain





04/02/19 09:15


ELECTROCARDIOGRAM DAILY 


   Comment: 


   Reason For Exam: chest pain





04/02/19 09:30


ELECTROCARDIOGRAM DAILY 


   Comment: 


   Reason For Exam: chest pain











Fingerstick Blood Sugar Results: 263





Critical Care Progress Note





- Nutrition


Nutrition: 


                                    Nutrition











 Category Date Time Status


 


 Heart Healthy Diet [DIET] Diets  03/31/19 Lunch Active














Assessment/Plan





- Assessment and Plan (Free Text)


Assessment: 





Pt is a 55 yo male with a PMH of DM, HTN, HLD, CAD with 3 stents from 1 year ago


Plan: 





Neuro


- monitor neuro status





Cardio


- CAD, HTN, HLD, MI


- pt has received 3 stents 1 year ago


- Trop 0.01, 0.58


- EKG showed significant 2mm ST depressions


- ASA, lipitor, plavix, metoprolol


- Dr Mcwilliams placed a cardiac stent





Pulm


- maintain O2 >92%





GI


- zofran





Nephro/ 


- BUN/Cr 14/0.8





Heme/ Onc


- INR 0.88


- Hgb 14.0





ID


- WBC 11.1





Endo


- DM


- maintain euglycemia 








Dispo: plan to transfer to [facility] at 3pm


Pt seen, examined, assessment and plan discussed with Dr Demond Zapien PGY1





- Date & Time


Date: 04/01/19


Time: 09:00





<Demond Early - Last Filed: 04/01/19 12:39>





CCU Objective





- Vital Signs / Intake & Output


Vital Signs (Last 4 hours): 


Vital Signs











  Temp Pulse Resp BP


 


 04/01/19 12:00   63  15  132/74


 


 04/01/19 11:30   61  15  129/70


 


 04/01/19 11:28   27 L   135/81


 


 04/01/19 11:00   85  17  148/79


 


 04/01/19 10:45   79  25 H  149/82


 


 04/01/19 10:30   64  27 H  135/81


 


 04/01/19 10:15   70  20  147/81


 


 04/01/19 10:00   66  14  139/86


 


 04/01/19 09:45   69  14  149/87


 


 04/01/19 09:30   68  25 H  146/81


 


 04/01/19 09:15  97.7 F  71  21  141/76











Intake and Output (Last 8hrs): 


                                 Intake & Output











 03/31/19 04/01/19 04/01/19





 22:59 06:59 14:59


 


Intake Total 1065.5 495 


 


Output Total 1000 950 


 


Balance 65.5 -455 


 


Weight  79.878 kg 


 


Intake:   


 


  .5 215 


 


    Right Antecubital  215 


 


    Right Hand 184  


 


  Oral 870 280 


 


Output:   


 


  Urine 1000 950 


 


    Urine, Voided 1000 950 


 


Other:   


 


  # Bowel Movements  1 














- Medications


Active Medications: 


Active Medications











Generic Name Dose Route Start Last Admin





  Trade Name Freq  PRN Reason Stop Dose Admin


 


Aspirin  81 mg  04/01/19 10:00  04/01/19 11:26





  Ecotrin  PO   Not Given





  DAILY [facility]   





     





     





     





     


 


Atorvastatin Calcium  40 mg  04/01/19 17:00  





  Lipitor  PO   





  DIN [facility]   





     





     





     





     


 


Clopidogrel Bisulfate  75 mg  04/01/19 10:00  04/01/19 11:59





  Plavix  PO   Not Given





  DAILY [facility]   





     





     





     





     


 


Sodium Chloride  1,000 mls @ 100 mls/hr  04/01/19 09:15  04/01/19 11:29





  Sodium Chloride 0.9%  IV  04/01/19 15:00  100 mls/hr





  .Q10H [facility]   Administration





     





     





     





     


 


Insulin Human Regular  0 units  03/31/19 09:15  04/01/19 11:27





  Humulin R Med  SC   5 units





  Q6H [facility]   Administration





     





     





  Protocol   





     


 


Metoprolol Tartrate  5 mg  03/31/19 16:00  04/01/19 04:20





  Lopressor  IVP   5 mg





  Q6H [facility]   Administration





     





     





     





     


 


Metoprolol Tartrate  25 mg  04/01/19 10:00  04/01/19 11:28





  Lopressor  PO   25 mg





  BID [facility]   Administration





     





     





     





     














- Patient Studies


Lab Studies: 


                              Microbiology Studies











 03/31/19 11:05 Urine Culture - Final





 Urine,Clean Catch    No Growth (<1,000 CFU/ML)








                                   Lab Studies











  04/01/19 04/01/19 03/31/19 Range/Units





  10:29 02:57 21:30 


 


APTT    52.4 H  (26.9-38.3)  Seconds


 


POC Glucose (mg/dL)  263 H  165 H   ()  mg/dL














  03/31/19 03/31/19 Range/Units





  21:21 15:30 


 


APTT   55.1 H  (26.9-38.3)  Seconds


 


POC Glucose (mg/dL)  303 H   ()  mg/dL








                         Laboratory Results - last 24 hr











  03/31/19 03/31/19 03/31/19





  15:30 21:21 21:30


 


APTT  55.1 H   52.4 H


 


POC Glucose (mg/dL)   303 H 














  04/01/19 04/01/19





  02:57 10:29


 


APTT  


 


POC Glucose (mg/dL)  165 H  263 H











EKG/Cardiology Studies: 


Cardiology / EKG Studies





03/31/19 14:58


EKG [ELECTROCARDIOGRAM] Stat 


   Comment: 


   Reason For Exam: chest pain/ hx of three stents/ increased troponin





04/01/19 07:01


EKG [ELECTROCARDIOGRAM] Stat 


   Comment: 


   Reason For Exam: positive troponin


   Does Patient Have a Pacemaker?: No





04/01/19 09:15


ELECTROCARDIOGRAM DAILY 


   Comment: 


   Reason For Exam: chest pain


ELECTROCARDIOGRAM Urgent 


   Comment: 12 lead EKG upon arrival in unit


   Reason For Exam: post ptca





04/01/19 09:19


ELECTROCARDIOGRAM Urgent 


   Comment: 12 lead EKG upon arrival in unit


   Reason For Exam: post ptca





04/01/19 09:30


ELECTROCARDIOGRAM DAILY 


   Comment: 


   Reason For Exam: chest pain





04/02/19 09:15


ELECTROCARDIOGRAM DAILY 


   Comment: 


   Reason For Exam: chest pain





04/02/19 09:30


ELECTROCARDIOGRAM DAILY 


   Comment: 


   Reason For Exam: chest pain














Critical Care Progress Note





- Nutrition


Nutrition: 


                                    Nutrition











 Category Date Time Status


 


 Heart Healthy Diet [DIET] Diets  03/31/19 Lunch Active














Addendum


Addendum: 





04/01/19 12:39


MIC[facility] Attending Addendum:


Patient seen and examined with housestaff, case discussed on rounds. I agree 

with resident note above with the following additions/exceptions:


54M  DM, HTN, HLD, CAD with prior stents


cath this am s/p stent placement in Lcx


hemodynamically stable, no chest pain


ASA Plavix Statin BB ACE-I


As per Dr Mcwilliams, stable for transfer to telemetry this afternoon


Rest of care as per resident note above


Demond Early MD


Attending


Pulmonary Critical Care Sleep Medicine

## 2019-04-01 NOTE — CARDCATH
PROCEDURE DATE:  04/01/2019



HISTORY:  The patient is a 54-year-old male with aggressive atherosclerosis

documented triple-vessel CAD and diabetes mellitus who presents with a

non-STEMI and continued unstable angina.  The patient is brought to the

cath lab this morning.



PROCEDURE:  Left heart catheterization with coronary arteriography, left

ventriculogram followed by PTCA and stent of a proximal circumflex artery.



The right femoral artery was cannulated with a 6-Polish sheath.  There were

no complications.



I performed moderate sedation which included the presence of an independent

trained observer that assisted in monitoring the patient's level of

consciousness and physiologic status.  After administration of Versed and

fentanyl, my intra-service time was 45 minutes.



The findings on catheterization revealed a left ventricle that contracted

normally.  Estimated ejection fraction is 55% to 60%.



His coronary anatomy revealed a right dominant circulation.  The RCA

revealed diffuse atherosclerosis with two stents that were patent.  There

was diffuse disease in the PDA and posterolateral branch.



The left main artery revealed mild intimal irregularities without critical

lesions.



The circumflex artery revealed two stents, one in the ostium of the circ,

one in the midportion of the circumflex artery.  The mid circumflex artery

was patent.  The ostial circumflex revealed a 99% stenoses.



The LAD revealed diffuse atherosclerosis throughout its tree with extensive

disease from the proximal all the way down to the mid lesion with two

tandem 70% lesions in the mid and distal LAD.



The patient was started on intravenous Angiomax under fluoroscopic guide,

the guiding catheter was placed in the ostium of left main artery.  An

0.014 ATW wire was used to place into the circumflex artery passed the

subtotal occluded circumflex.  A second wire was placed in the high obtuse

marginal branch or ramus intermedius.



A 2.0 balloon was utilized to dilate both lesions, one in the ramus and one

in the in-stent restenosis of the circumflex artery.  A 2.25 x 8 mm

drug-eluting stent was placed and deployed at 15 ounces of pressure. 

Repeat coronary arteriography revealed an excellent result with no residual

stenosis and NEDA III flow.  The ramus intermedius 90% stenoses, improved

to 50% with just ballooning.



The vessel was too small for PTCA for stenting.



Angio-Seal was used to close the femoral artery site.  The patient

tolerated the procedure well.



In summary, the procedure was successful PTCA and stent of a subtotally

occluded in-stent restenosis of an ostial circumflex artery.



Cardiac catheterization reveals patent stents in the RCA x2, patent stent

in the midcircumflex artery with an in-stent restenosis of the ostial

circumflex as well as multiple 70% lesions in the LAD.



LV function is normal.



Given these findings, the patient will need to remain on aspirin

indefinitely and Plavix for least a year.  He needs to undergo an

aggressive cardiac risk reduction program.  He may benefit from a more

aggressive cholesterol strategy including the possibility of Repatha.







__________________________________________

Jam Mcwilliams MD





DD:  04/01/2019 9:35:08

DT:  04/01/2019 9:39:42

Job # [id number]

## 2019-04-01 NOTE — CP.PCM.PN
Subjective





- Date & Time of Evaluation


Date of Evaluation: 04/01/19


Time of Evaluation: 15:50





- Subjective


Subjective: 





resting comfortably, NAD, denies chest pain, no SOB





Objective





- Vital Signs/Intake and Output


Vital Signs (last 24 hours): 


                                        











Temp Pulse Resp BP Pulse Ox


 


 98.2 F   79   18   141/89   99 


 


 04/01/19 15:00  04/01/19 15:00  04/01/19 15:00  04/01/19 15:00  04/01/19 06:00








Intake and Output: 


                                        











 04/01/19 04/01/19





 06:59 18:59


 


Intake Total 1550.5 


 


Output Total 1950 


 


Balance -399.5 














- Medications


Medications: 


                               Current Medications





Aspirin (Ecotrin)  81 mg PO DAILY [facility]


   Last Admin: 04/01/19 11:26 Dose:  Not Given


Atorvastatin Calcium (Lipitor)  40 mg PO DIN [facility]


Clopidogrel Bisulfate (Plavix)  75 mg PO DAILY [facility]


   Last Admin: 04/01/19 11:59 Dose:  Not Given


Insulin Human Regular (Humulin R Med)  0 units SC Q6H [facility]; Protocol


   Last Admin: 04/01/19 15:25 Dose:  5 u


Metoprolol Tartrate (Lopressor)  5 mg IVP Q6H [facility]


   Last Admin: 04/01/19 04:20 Dose:  5 mg


Metoprolol Tartrate (Lopressor)  25 mg PO BID [facility]


   Last Admin: 04/01/19 11:28 Dose:  25 mg











- Labs


Labs: 


                                        





                                 03/31/19 07:30 





                                 03/31/19 07:30 





                                        











PT  9.9 SECONDS (9.4-12.5)   03/31/19  07:20    


 


INR  0.88   03/31/19  07:20    


 


APTT  52.4 Seconds (26.9-38.3)  H  03/31/19  21:30    














- Respiratory Exam


Respiratory Exam: Clear to Ausculation Bilateral, NORMAL BREATHING PATTERN





- Cardiovascular Exam


Cardiovascular Exam: REGULAR RHYTHM





- GI/Abdominal Exam


GI & Abdominal Exam: Soft, Normal Bowel Sounds





- Extremities Exam


Extremities Exam: Normal Inspection





- Neurological Exam


Neurological Exam: Alert, Awake





- Skin


Skin Exam: Dry, Warm





Assessment and Plan


(1) NSTEMI (non-ST elevated myocardial infarction)


Status: Acute   





(2) Type II diabetes mellitus


Status: Acute   





- Assessment and Plan (Free Text)


Plan: 





continue post-op baljit cardio follow-up

## 2019-04-01 NOTE — CON
DATE:  04/01/2019



LOCATION:  The patient is in , bed 3.



REASON FOR CONSULTATION AND FOLLOWUP:  Coronary artery disease,

non-ST-elevation myocardial infarction, diabetes, hypertension, and high

cholesterol.



HISTORY OF PRESENT ILLNESS:  The patient is a 54-year-old male, known case

of coronary artery disease, status post stent insertion on 12/11/2017,

admitted that he woke up from sleep with tightness in the chest which

lasted 1-1/2 hour, the patient came to emergency room.  Initial troponin

was less than 0.01, second troponin 0.58, suggestive of non-ST-elevation

myocardial infarction.  The patient's EKG showed left bundle branch block

like before.



PAST MEDICAL HISTORY:  Positive for diabetes mellitus, hypertension, high

cholesterol, status post drug-eluting stent put in mid RCA, ostial

circumflex and OM1 on 12/11/2017.



FAMILY HISTORY:  Positive for diabetes mellitus and hypertension.  Father

had coronary artery disease.



SOCIAL HISTORY:  Denies smoking.  Denies drinking.



ALLERGIES:  THE PATIENT DENIES ANY ALLERGIES.



LIST OF MEDICATIONS AT HOME:  The patient was on Coreg 25 mg b.i.d.,

losartan 100 mg daily, Januvia 100 mg daily, amlodipine 10 mg daily,

aspirin 81 mg daily, Lipitor 10 mg daily, Plavix 75 mg daily, and metformin

1000 mg b.i.d.



REVIEW OF SYSTEMS:  All other systems reviewed.  Positives mentioned in the

history, otherwise negative.



PREVIOUS CARDIAC WORKUP:  The patient had stress test on 11/09/2017, which

showed ischemia and intraseptal, apical and inferolateral with ejection

fraction close to 34%.  The patient had cardiac catheterization on

12/11/2017 and it showed that the patient's ejection fraction was 35%. 

Distal target vessels were very poor.  The patient has PTCA with

drug-eluting stent put in mid RCA, ostial circumflex and OM1.  The patient

had followup MUGA scan on 07/12/2018, which showed LV ejection fraction

improved to 54%.  The patient had repeat stress test on 01/25/2019 when he

was asymptomatic on the Treadmill without any chest pain.  The patient has

some ST-T changes, but the patient had left bundle branch block as before. 

Scan showed partially reversible intraseptal, apical, and inferolateral

defect which showed improvement as compared to stress test of 11/09/2017;

however, ejection fraction was 34% and at that time this stress test was

asymptomatic as far as chest pain was concerned, and scan showed

improvement in his ischemic changes on the nuclear stress test.  Also, MUGA

scan has improved to 54% from 33% and 34%.  So, it was decided to treat him

medically.  A repeat MUGA scan was requested.  The patient's echocardiogram

on 01/25/2019, showed normal size LV.  LV shows mild hypertrophy, ejection

fraction of 45%, and trace tricuspid regurgitation.



Cardiac catheterization finding on 04/01/2019, Dr. Tsai was away on

conference, so cardiac catheterization was done by Dr. Mcwilliams because the

patient had chest pain and non-ST-elevation myocardial infarction with

troponin elevated.  The patient's left ventriculogram showed ejection

fraction 55% to 60%, right dominant circulation.  RCA revealed diffuse

atherosclerosis with two stents that were patent.  There was diffuse

disease in the PDA and posterolateral branch.  Left main artery showed mild

intimal irregularities without any critical lesion.  Circumflex artery

revealed stents, one in the ostium of the circumflex, one in the midportion

of the circumflex artery.  The mid circumflex artery stent was patent.  The

ostial circumflex stent showed inside the stent 99% stenosis, in which a

new stent was put in.  LAD revealed diffuse atherosclerosis throughout its

tree with extensive disease from the proximal to all the way down to the

mid lesion with two tandem 70% lesions in the mid and distal LAD.  The

patient had ramus intermedius 90% stenosis, which improved with balloon

angioplasty to 50%.  The vessel was too small for PTCA for stenting.  In

summary, the cardiac catheterization revealed patent stents in RCA, two

stents were patent, patent stent in the midcircumflex artery, but with the

in-stent restenosis of the ostial circumflex as well as multiple 70%

lesions in the LAD.  So, successful in-stent new stent was put in the

ostial circumflex and plain balloon angioplasty of ramus intermedius was

done which showed stenosis improving from 90% to 50%.



PHYSICAL EXAMINATION:

GENERAL:  The patient is now lying flat in bed.

VITAL SIGNS:  Blood pressure 132/74, respirations 15, pulse 63, and

afebrile.

HEENT:  Head is normocephalic.  Eyes; pupils normal.  Conjunctiva normal. 

Nose and throat normal.

NECK:  JVP low.  Carotid equal.

THORAX:  AP diameter normal.

LUNGS:  Clear.

CARDIOVASCULAR:  S1 and S2.

ABDOMEN:  Soft.  No tenderness.  No organomegaly.  Bowel sound normal.

EXTREMITIES:  No clubbing.  No cyanosis.



LABORATORY DATA:  WBC 11.1, hemoglobin 14.0, hematocrit 41.8, and platelet

239.  Sodium 139, potassium 3.8, BUN 14, creatinine 0.8, and random sugar

263, another sugar 191.  Initial troponin 0.01 and second troponin 0.58. 

AST, ALT, total protein, magnesium, and calcium are normal.  NT-pro B-type

natriuretic peptide 63.  Chest x-ray was clear.  EKG showed regular sinus

rhythm, left bundle branch block as before.



DIAGNOSES:  Non-ST-elevation myocardial infarction, status post

drug-eluting stent insertion, and ostial circumflex stent and plain balloon

angioplasty of ramus intermedius, from 90% to 50% improvement in the

blockage.  Diabetes, hypertension and hyperlipidemia.



PLAN:  The patient is on aspirin 81 mg daily, atorvastatin 40 mg daily,

metoprolol 25 mg b.i.d., and Plavix 75 mg daily.  We will continue present

therapy and we will follow closely with you.







__________________________________________

Maria L Milton MD





DD:  04/01/2019 13:14:06

DT:  04/01/2019 17:13:43

Job # [id number]

## 2019-04-02 VITALS — SYSTOLIC BLOOD PRESSURE: 159 MMHG | HEART RATE: 86 BPM | DIASTOLIC BLOOD PRESSURE: 86 MMHG

## 2019-04-02 VITALS — TEMPERATURE: 98.1 F | RESPIRATION RATE: 16 BRPM

## 2019-04-02 VITALS — OXYGEN SATURATION: 97 %

## 2019-04-02 LAB
BASOPHILS # BLD AUTO: 0.03 K/MM3 (ref 0–2)
BASOPHILS NFR BLD: 0.3 % (ref 0–3)
BUN SERPL-MCNC: 11 MG/DL (ref 7–21)
CALCIUM SERPL-MCNC: 9.2 MG/DL (ref 8.4–10.5)
EOSINOPHIL # BLD: 0.1 10*3/UL (ref 0–0.7)
EOSINOPHIL NFR BLD: 1.1 % (ref 1.5–5)
ERYTHROCYTE [DISTWIDTH] IN BLOOD BY AUTOMATED COUNT: 13.1 % (ref 11.5–14.5)
GFR NON-AFRICAN AMERICAN: > 60
HGB BLD-MCNC: 12.8 G/DL (ref 14–18)
LYMPHOCYTES # BLD: 3.1 10*3/UL (ref 1.2–3.4)
LYMPHOCYTES NFR BLD AUTO: 27.5 % (ref 22–35)
MCH RBC QN AUTO: 27.8 PG (ref 25–35)
MCHC RBC AUTO-ENTMCNC: 31.7 G/DL (ref 31–37)
MCV RBC AUTO: 87.6 FL (ref 80–105)
MONOCYTES # BLD AUTO: 0.8 10*3/UL (ref 0.1–0.6)
MONOCYTES NFR BLD: 7.2 % (ref 1–6)
PLATELET # BLD: 200 10^3/UL (ref 120–450)
PMV BLD AUTO: 11.6 FL (ref 7–11)
RBC # BLD AUTO: 4.61 10^6/UL (ref 3.5–6.1)
WBC # BLD AUTO: 11.4 10^3/UL (ref 4.5–11)

## 2019-04-02 RX ADMIN — INSULIN HUMAN SCH U: 100 INJECTION, SOLUTION PARENTERAL at 06:35

## 2019-04-02 NOTE — DS
HOSPITAL COURSE:  The patient is a 54-year-old male admitted through the

emergency department on 03/31/2019 with precordial chest pressure.  The

patient had an elevated troponin level is 0.58 and was seen in consultation

by Dr. Jam Mcwilliams.  The patient underwent cardiac catheterization on

04/01/2019 which showed obstruction of one of his dense he underwent PTCA

and stent of a subtotally occluded in stent restenosis of an ostial

circumflex artery.  The patient has had an uncomplicated postoperative

course and is now medically stable for discharge.



PHYSICAL EXAMINATION:

VITAL SIGNS:  Blood pressure 159/86, pulse 86, the patient is afebrile,

respiratory rate 16.

LUNGS:  Clear.

HEART:  Regular rate and rhythm.

ABDOMEN:  Soft, nontender.  Bowel sounds are normoactive.

EXTREMITIES:  Without cyanosis, clubbing or edema.



IMPRESSION:

1.  Coronary artery disease/non-ST segment elevation myocardial infarction

status post cardiac catheterization with percutaneous transluminal coronary

angioplasty/stent placement.

2.  Type 2 diabetes mellitus.

3.  Hypercholesterolemia.

4.  Hypertension.



PLAN:  The patient will be discharged to home today in stable condition on

the following medications, metformin 1000 mg twice daily, Januvia 100 mg

daily, losartan 100 mg daily, metoprolol 25 mg twice daily, Lipitor 10 mg

daily and Plavix 75 mg daily.  The patient be maintained on a heart-healthy

diet.  Activities ad libitum.  He will be followed as an outpatient within

the next 1-2 weeks in my office as well as by Dr. Milton/Dr. Tsai.







__________________________________________

CARLOS ALBERTO Vincent MD





DD:  04/02/2019 14:12:16

DT:  04/02/2019 14:13:06

Job # [id number]

## 2019-04-02 NOTE — PN
DATE:  04/02/2019



LOCATION:  The patient in , bed 3.



REASON FOR CONSULTATION AND FOLLOWUP:  Coronary artery disease, non-ST

elevation myocardial infarction, diabetes, hypertension, high cholesterol,

status post angioplasty and insertion of stent within the stent for

occlusion, ostial circumflex stent and plain balloon angioplasty of ramus

intermedius which was reduced from 90% to 50%, diabetes, hypertension,

hyperlipidemia.



SUBJECTIVE:  The patient denies any chest pain, shortness of breath or

palpitation.



PHYSICAL EXAMINATION

VITAL SIGNS:  Blood pressure is 159/86, respirations 18 and pulse 86.  The

patient afebrile.

HEENT:  Head is normocephalic.  Eyes; pupils normal.  Conjunctivae normal. 

Nose and throat normal.

NECK:  JVP low.  Carotids equal.

THORAX:  AP diameter normal.

LUNGS:  Clear.

CARDIOVASCULAR:  S1 and S2.

ABDOMEN:  Soft.  No tenderness.  No organomegaly.

EXTREMITIES:  No clubbing.  No cyanosis.



LABORATORY DATA:  WBC 11.4, hemoglobin 12.8, hematocrit 40.4 and platelet

200.  Sodium 140, potassium 4.1, BUN 11, creatinine 0.8, random sugar 218,

calcium is 9.2 and cholesterol 129.  The patient's first troponin on

admission was less than 0.01, second was 0.58.  The patient has cardiac

cath by Dr. Mcwilliams, because Dr. Tsai was away and the patient found to have

critical blockage in the ostial circumflex stent, which was previously put,

so there was a new drug-eluting stent put within the stent in the ostial

stent and the patient had plain balloon angioplasty of ramus intermedius

which the balloon became from 90% to 50%.  Detail cath finding was

mentioned in our consult of 04/01/2019, so please refer to that.



DIAGNOSES:  Diabetes, hypertension, hyperlipidemia, ejection fraction 55%

to 60%, stents in right coronary artery were patent.



PLAN:  The patient at home was taking amlodipine 10 mg daily, Januvia 100

mg daily, metformin 1000 mg b.i.d., losartan 100 mg daily, Plavix 75 mg

daily, Coreg 25 mg b.i.d., atorvastatin 10 mg daily, aspirin 81 mg daily. 

He will continue same medications.  I will follow in office, _____ followup

the blood work.  The patient was given instructions for diet as well.







__________________________________________

Maria L Milton MD





DD:  04/02/2019 12:13:09

DT:  04/02/2019 13:33:37

[facility] # [id number]

## 2019-04-02 NOTE — CARD
--------------- APPROVED REPORT --------------





Date of service: 04/02/2019



EKG Measurement

Heart [id number]

MA [id number]

[id number]

[id number]

[id number]



<Conclusion>

Normal sinus rhythm

Left bundle branch block

Abnormal ECG